# Patient Record
(demographics unavailable — no encounter records)

---

## 2024-11-14 NOTE — ASSESSMENT
[FreeTextEntry1] : Jak is 67-year-old  female with abdominal bloating, fullness and nausea mostly in nighttimes Likely GERD related, however H. pylori or gastroparesis cannot be ruled out Commended upper endoscopy with possible EUS to evaluate for gallstones and pancreatic pathology Advised her to start taking pantoprazole 40 mg daily and hold famotidine

## 2024-11-14 NOTE — HISTORY OF PRESENT ILLNESS
[FreeTextEntry1] : STEVE CHANCE 67 year  F came for initial visit c/o abdominal bloating,gas,fullness and nausea sensation especially after meals for the past few years. She increased her famotidine to 40 mg daily without much improvement.  She takes Dulcolax for bowel movements.  Her diabetes is under control with Farxiga and metformin She was accompanied by her , she recalls that I have seen her more than 15 years ago at my [place] office location Symptoms are usually worse during the nights sometimes disturbing her sleep pattern.  Denied any cough.  Denied loss of appetite or weight are any change in her bowel movements She had upper endoscopy and colonoscopy more than 5 years ago by Dr. Lassiter Denied any VCD or weight loss. H.Pyl status is not known.No hx of excess use of NSAIDS or ETOH. No hx of substance abuse or OTC health food supplements.No FMH of IBD or Celiac Disease.

## 2024-11-14 NOTE — PHYSICAL EXAM
[Alert] : alert [Normal Voice/Communication] : normal voice/communication [Healthy Appearing] : healthy appearing [No Acute Distress] : no acute distress [Sclera] : the sclera and conjunctiva were normal [Hearing Threshold Finger Rub Not [place]] : hearing was normal [Normal Lips/Gums] : the lips and gums were normal [Oropharynx] : the oropharynx was normal [Normal Appearance] : the appearance of the neck was normal [No Neck Mass] : no neck mass was observed [No Respiratory Distress] : no respiratory distress [No Acc Muscle Use] : no accessory muscle use [Respiration, Rhythm And Depth] : normal respiratory rhythm and effort [Auscultation Breath Sounds / Voice Sounds] : lungs were clear to auscultation bilaterally [Heart Rate And Rhythm] : heart rate was normal and rhythm regular [Normal S1, S2] : normal S1 and S2 [Murmurs] : no murmurs [None] : no edema [Bowel Sounds] : normal bowel sounds [Abdomen Tenderness] : non-tender [No Masses] : no abdominal mass palpated [Abdomen Soft] : soft [Oriented To Time, Place, And Person] : oriented to person, place, and time [Cervical Lymph Nodes Enlarged Posterior Bilaterally] : no posterior cervical lymphadenopathy [Supraclavicular Lymph Nodes Enlarged Bilaterally] : no supraclavicular lymphadenopathy [Cervical Lymph Nodes Enlarged Anterior Bilaterally] : no anterior cervical lymphadenopathy [Abnormal Walk] : normal gait [No Clubbing, Cyanosis] : no clubbing or cyanosis of the fingernails [Normal Color / Pigmentation] : normal skin color and pigmentation [] : no rash

## 2025-03-18 NOTE — HISTORY OF PRESENT ILLNESS
[FreeTextEntry1] : STEVE CHANCE 67 year She came for follow up and discussion of recent EGD and biopsy findings. Gastric Biopsy showed non specific gastritis and no Helicobacter Pylori .Duodenal biopsies were negative for villous atrophy Patient reports  relief of abdominal  bloating,fullness/discomfort and heartburn while taking Pantoprazole. She reports regular Bowel movements with no BPR or melena.

## 2025-03-18 NOTE — PHYSICAL EXAM
[Alert] : alert [Normal Voice/Communication] : normal voice/communication [Healthy Appearing] : healthy appearing [No Acute Distress] : no acute distress [Sclera] : the sclera and conjunctiva were normal [Hearing Threshold Finger Rub Not [place]] : hearing was normal [Normal Lips/Gums] : the lips and gums were normal [Oropharynx] : the oropharynx was normal [Normal Appearance] : the appearance of the neck was normal [No Neck Mass] : no neck mass was observed [No Respiratory Distress] : no respiratory distress [No Acc Muscle Use] : no accessory muscle use [Respiration, Rhythm And Depth] : normal respiratory rhythm and effort [Auscultation Breath Sounds / Voice Sounds] : lungs were clear to auscultation bilaterally [Heart Rate And Rhythm] : heart rate was normal and rhythm regular [Normal S1, S2] : normal S1 and S2 [Murmurs] : no murmurs [None] : no edema [Bowel Sounds] : normal bowel sounds [Abdomen Tenderness] : non-tender [No Masses] : no abdominal mass palpated [Abdomen Soft] : soft [Cervical Lymph Nodes Enlarged Posterior Bilaterally] : no posterior cervical lymphadenopathy [Supraclavicular Lymph Nodes Enlarged Bilaterally] : no supraclavicular lymphadenopathy [Cervical Lymph Nodes Enlarged Anterior Bilaterally] : no anterior cervical lymphadenopathy [Abnormal Walk] : normal gait [No Clubbing, Cyanosis] : no clubbing or cyanosis of the fingernails [Normal Color / Pigmentation] : normal skin color and pigmentation [] : no rash [Oriented To Time, Place, And Person] : oriented to person, place, and time

## 2025-03-18 NOTE — ASSESSMENT
[FreeTextEntry1] : Jak Jose is a 67-year-old lady with abdominal bloating fullness and gas symptoms for several years.  EGD and biopsies were negative for Helicobacter and villous atrophy.  Advised her to continue pantoprazole as needed basis. Dietary changes discussed, advised to hold dairy and instead use alternative milk products. Follow-up as needed